# Patient Record
Sex: FEMALE | Race: BLACK OR AFRICAN AMERICAN
[De-identification: names, ages, dates, MRNs, and addresses within clinical notes are randomized per-mention and may not be internally consistent; named-entity substitution may affect disease eponyms.]

---

## 2021-05-06 ENCOUNTER — HOSPITAL ENCOUNTER (EMERGENCY)
Dept: HOSPITAL 53 - M ED | Age: 21
Discharge: HOME | End: 2021-05-06
Payer: COMMERCIAL

## 2021-05-06 VITALS — SYSTOLIC BLOOD PRESSURE: 110 MMHG | DIASTOLIC BLOOD PRESSURE: 68 MMHG

## 2021-05-06 VITALS — HEIGHT: 62 IN | BODY MASS INDEX: 26.55 KG/M2 | WEIGHT: 144.29 LBS
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
## 2021-05-06 DIAGNOSIS — Z87.81: ICD-10-CM
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
## 2021-05-06 DIAGNOSIS — M25.559: ICD-10-CM

## 2021-05-06 DIAGNOSIS — S76.019A: Primary | ICD-10-CM

## 2021-05-06 LAB
B-HCG SERPL-ACNC: (no result) MIU/ML
BASOPHILS # BLD AUTO: 0 10^3/UL (ref 0–0.2)
BASOPHILS NFR BLD AUTO: 0.2 % (ref 0–1)
CALCIUM SERPL-MCNC: 9.9 MG/DL (ref 8.5–10.1)
CRP SERPL-MCNC: < 0.3 MG/DL (ref 0–0.3)
EOSINOPHIL # BLD AUTO: 0 10^3/UL (ref 0–0.5)
EOSINOPHIL NFR BLD AUTO: 0.3 % (ref 0–3)
ERYTHROCYTE [SEDIMENTATION RATE] IN BLOOD BY WESTERGREN METHOD: 18 MM/HR (ref 0–20)
HCT VFR BLD AUTO: 35.4 % (ref 36–47)
HGB BLD-MCNC: 11.8 G/DL (ref 12–15.5)
LYMPHOCYTES # BLD AUTO: 2.1 10^3/UL (ref 1.5–5)
LYMPHOCYTES NFR BLD AUTO: 23.8 % (ref 24–44)
MCH RBC QN AUTO: 29.1 PG (ref 27–33)
MCHC RBC AUTO-ENTMCNC: 33.3 G/DL (ref 32–36.5)
MCV RBC AUTO: 87.4 FL (ref 80–96)
MONOCYTES # BLD AUTO: 0.6 10^3/UL (ref 0–0.8)
MONOCYTES NFR BLD AUTO: 6.2 % (ref 2–8)
NEUTROPHILS # BLD AUTO: 6.1 10^3/UL (ref 1.5–8.5)
NEUTROPHILS NFR BLD AUTO: 69.1 % (ref 36–66)
PLATELET # BLD AUTO: 239 10^3/UL (ref 150–450)
RBC # BLD AUTO: 4.05 10^6/UL (ref 4–5.4)
TSH SERPL DL<=0.005 MIU/L-ACNC: 1.18 UIU/ML (ref 0.36–3.74)
WBC # BLD AUTO: 8.9 10^3/UL (ref 4–10)

## 2021-07-28 ENCOUNTER — HOSPITAL ENCOUNTER (OUTPATIENT)
Dept: HOSPITAL 53 - M LDO | Age: 21
Discharge: HOME | End: 2021-07-28
Attending: HEALTH CARE PROVIDER
Payer: COMMERCIAL

## 2021-07-28 ENCOUNTER — HOSPITAL ENCOUNTER (EMERGENCY)
Dept: HOSPITAL 53 - M ED | Age: 21
Discharge: STILL ADMITTED OR ADMINISTRATIVE | End: 2021-07-28
Payer: COMMERCIAL

## 2021-07-28 VITALS — HEIGHT: 62 IN | WEIGHT: 155.32 LBS | BODY MASS INDEX: 28.58 KG/M2

## 2021-07-28 VITALS — DIASTOLIC BLOOD PRESSURE: 58 MMHG | SYSTOLIC BLOOD PRESSURE: 116 MMHG

## 2021-07-28 VITALS — DIASTOLIC BLOOD PRESSURE: 85 MMHG | SYSTOLIC BLOOD PRESSURE: 149 MMHG

## 2021-07-28 VITALS — WEIGHT: 156.09 LBS | BODY MASS INDEX: 28.72 KG/M2 | HEIGHT: 62 IN

## 2021-07-28 DIAGNOSIS — O26.892: ICD-10-CM

## 2021-07-28 DIAGNOSIS — Z53.21: Primary | ICD-10-CM

## 2021-07-28 DIAGNOSIS — K21.9: ICD-10-CM

## 2021-07-28 DIAGNOSIS — R10.10: ICD-10-CM

## 2021-07-28 DIAGNOSIS — O99.612: Primary | ICD-10-CM

## 2021-07-28 DIAGNOSIS — Z3A.00: ICD-10-CM

## 2021-07-28 DIAGNOSIS — Z3A.22: ICD-10-CM

## 2021-07-28 DIAGNOSIS — Z65.8: ICD-10-CM

## 2021-07-28 PROCEDURE — 59025 FETAL NON-STRESS TEST: CPT

## 2021-07-28 NOTE — IPNPDOC
Subjective


Date Seen


The patient was seen on 21.





Subjective


Chief Complaint/HPI


21yo  at 22w2d with OMA 21 presents to triage from the ER due to upper

abdominal pain and overall anxiety regarding the status of her baby.  Patient 

states that she is very overwhelmed at work right now and is surrounded by other

women who have recently experienced miscarriages or  deliveries, and is 

concerned about her baby.  She states her pain began this afternoon, which she 

describes as sharp/burning, and feels something hot come up in her throat. 

Denies any N/V, diarrhea or constipation.  She feels like the pain started when 

she became anxious at work.   She is otherwise without complaint. Denies 

contractions, LOF, VB or discharge. +GFM. She recently had anatomy US completed 

with incomplete views (per patient) and is scheduled for follow up in a few 

weeks.


General:  Reports: Normal Appetite; 


   Denies: Chills, Night Sweats, Fatigue, Malaise


Constitutional:  Denies: Chills, Fever, Night Sweats


Pulmonary:  Denies: Dyspnea, Cough


Gastrointestinal:  Reports: Abdominal Pain; 


   Denies: Nausea, Vomiting, Diarrhea, Constipation


Genitourinary:  Denies: Dysuria, Frequency, Incontinence, Retention


Psych:  Reports: Mood Normal; 


   Denies: Depression, Memory Issues





Objective


Physical Examination


General Exam:  Positive: Alert, No Acute Distress


Chest Exam:  Positive: Normal air movement


Heart Exam:  Positive: Rate Normal


Abdomen Exam:  Positive: Soft; 


   Negative: Tenderness (generalized discomfort on palpation of upper abdomen.  

No lower abdominal pain)


Other physical findings


TAUS: cephalic presentation, 's, subjectively normal fluid. Posterior 

placenta. Fetal anatomy not assessed.





[place]: No uterine contractions.  Some irritability noted, pt hydrating.





Assessment /Plan


Assessment


21yo  at 22w2d with OMA 2021 with upper abdominal pain and symptoms 

most consistent with reflux.  Pepcid provided in triage and pt provided water 

and encouraged to hydrate.  Has had less than 1 L of fluid today per her report.

 Given patient's level of concern for baby, inquired about any trauma/abuse, and

if she feels safe at home. States [place] and is

trying to get out.  Denies any trauma, however states she does not sleep well 

which adds to her anxiety.  Rx provided for pepcid twice daily and hydroxizine 

qHS - encouraged pt to take to [facility] tomorrow to fill Rx.  Bedside 

TAUS performed and reassuring. Normal fluid, posterior placenta, and normal FHR 

noted.  SVE deferred as pt currently denies any OB complaint. Has some 

irritability on toco, likely due to dehydration.  States she has [facility] follow up 

in a couple of weeks. Return precautions given.





Note provided requesting permission for patient to arrive late to work tomorrow 

given late assessment this evening and her difficulty with sleep.





Plan/VTE


VTE Prophylaxis Ordered?:  No


VTE Exclusion Mechanical Proph:  Other (triage visit )





VS, I&O, 24H, Fishbone


Vital Signs/I&O





Vital Signs








  Date Time  Temp Pulse Resp B/P (MAP) Pulse Ox O2 Delivery O2 Flow Rate FiO2


 


21 21:27 98.3 77 18 116/58 (77)    

















ROBIN KAMARA M.D.        2021 22:54

## 2021-08-12 ENCOUNTER — HOSPITAL ENCOUNTER (EMERGENCY)
Dept: HOSPITAL 53 - M ED | Age: 21
Discharge: HOME | End: 2021-08-12
Payer: COMMERCIAL

## 2021-08-12 VITALS — DIASTOLIC BLOOD PRESSURE: 63 MMHG | SYSTOLIC BLOOD PRESSURE: 113 MMHG

## 2021-08-12 VITALS — WEIGHT: 160.34 LBS | HEIGHT: 62 IN | BODY MASS INDEX: 29.51 KG/M2

## 2021-08-12 DIAGNOSIS — Z3A.25: ICD-10-CM

## 2021-08-12 DIAGNOSIS — O99.512: Primary | ICD-10-CM

## 2021-08-12 DIAGNOSIS — O99.412: ICD-10-CM

## 2021-08-12 DIAGNOSIS — O99.342: ICD-10-CM

## 2021-08-12 DIAGNOSIS — O99.332: ICD-10-CM

## 2021-08-12 LAB
ALBUMIN SERPL BCG-MCNC: 2.8 GM/DL (ref 3.2–5.2)
ALT SERPL W P-5'-P-CCNC: 18 U/L (ref 12–78)
BASOPHILS # BLD AUTO: 0 10^3/UL (ref 0–0.2)
BASOPHILS NFR BLD AUTO: 0.2 % (ref 0–1)
BILIRUB CONJ SERPL-MCNC: < 0.1 MG/DL (ref 0–0.2)
BILIRUB SERPL-MCNC: 0.2 MG/DL (ref 0.2–1)
BUN SERPL-MCNC: 6 MG/DL (ref 7–18)
CALCIUM SERPL-MCNC: 8.9 MG/DL (ref 8.5–10.1)
CHLORIDE SERPL-SCNC: 109 MEQ/L (ref 98–107)
CK MB CFR.DF SERPL CALC: 1.52
CK MB SERPL-MCNC: < 1 NG/ML (ref ?–3.6)
CK SERPL-CCNC: 66 U/L (ref 26–192)
CO2 SERPL-SCNC: 22 MEQ/L (ref 21–32)
CREAT SERPL-MCNC: 0.5 MG/DL (ref 0.55–1.3)
EOSINOPHIL # BLD AUTO: 0 10^3/UL (ref 0–0.5)
EOSINOPHIL NFR BLD AUTO: 0.1 % (ref 0–3)
GFR SERPL CREATININE-BSD FRML MDRD: > 60 ML/MIN/{1.73_M2} (ref 60–?)
GLUCOSE SERPL-MCNC: 88 MG/DL (ref 70–100)
HCT VFR BLD AUTO: 30.5 % (ref 36–47)
HGB BLD-MCNC: 10.3 G/DL (ref 12–15.5)
LYMPHOCYTES # BLD AUTO: 1.7 10^3/UL (ref 1.5–5)
LYMPHOCYTES NFR BLD AUTO: 16.7 % (ref 24–44)
MCH RBC QN AUTO: 30.2 PG (ref 27–33)
MCHC RBC AUTO-ENTMCNC: 33.8 G/DL (ref 32–36.5)
MCV RBC AUTO: 89.4 FL (ref 80–96)
MONOCYTES # BLD AUTO: 0.6 10^3/UL (ref 0–0.8)
MONOCYTES NFR BLD AUTO: 5.9 % (ref 2–8)
NEUTROPHILS # BLD AUTO: 7.9 10^3/UL (ref 1.5–8.5)
NEUTROPHILS NFR BLD AUTO: 75.6 % (ref 36–66)
NT-PRO BNP: 123 PG/ML (ref ?–125)
PLATELET # BLD AUTO: 222 10^3/UL (ref 150–450)
POTASSIUM SERPL-SCNC: 3.7 MEQ/L (ref 3.5–5.1)
PROT SERPL-MCNC: 6 GM/DL (ref 6.4–8.2)
RBC # BLD AUTO: 3.41 10^6/UL (ref 4–5.4)
SODIUM SERPL-SCNC: 138 MEQ/L (ref 136–145)
TROPONIN I SERPL-MCNC: < 0.02 NG/ML (ref ?–0.1)
WBC # BLD AUTO: 10.4 10^3/UL (ref 4–10)

## 2021-08-12 PROCEDURE — 93041 RHYTHM ECG TRACING: CPT

## 2021-08-12 PROCEDURE — 82553 CREATINE MB FRACTION: CPT

## 2021-08-12 PROCEDURE — 93005 ELECTROCARDIOGRAM TRACING: CPT

## 2021-08-12 PROCEDURE — 87798 DETECT AGENT NOS DNA AMP: CPT

## 2021-08-12 PROCEDURE — 83880 ASSAY OF NATRIURETIC PEPTIDE: CPT

## 2021-08-12 PROCEDURE — 85025 COMPLETE CBC W/AUTO DIFF WBC: CPT

## 2021-08-12 PROCEDURE — 84484 ASSAY OF TROPONIN QUANT: CPT

## 2021-08-12 PROCEDURE — 99284 EMERGENCY DEPT VISIT MOD MDM: CPT

## 2021-08-12 PROCEDURE — 82550 ASSAY OF CK (CPK): CPT

## 2021-08-12 PROCEDURE — 94760 N-INVAS EAR/PLS OXIMETRY 1: CPT

## 2021-08-12 PROCEDURE — 80048 BASIC METABOLIC PNL TOTAL CA: CPT

## 2021-08-12 PROCEDURE — 80076 HEPATIC FUNCTION PANEL: CPT

## 2021-08-12 PROCEDURE — 71275 CT ANGIOGRAPHY CHEST: CPT

## 2021-08-12 NOTE — REP
INDICATION:

SOB.



COMPARISON:

None.



TECHNIQUE:

Scans were obtained during contrast administration using the pulmonary embolus

technique.



FINDINGS:

The lungs are clear.  There is no evidence of pulmonary embolus.  The ascending and

descending thoracic aorta are unremarkable.  The great vessels show normal origins

from the arch.  There is no pleural fluid.  There is no adenopathy.  The adrenal

glands are not enlarged.  The heart is not enlarged.  There is no pleural effusion.



IMPRESSION:

Negative CTA chest.





<Electronically signed by Silas Higgins > 08/12/21 [id number]

## 2021-08-12 NOTE — ECGEPIP
[facility] - ED

                                       

                                       Test Date:    2021

Pat Name:     MURRAY ERIC            Department:   

Patient ID:   [id number]                 Room:         -

Gender:       Female                   Technician:   JOANNE

:          2000               Requested By: Radha Sotomayor 

Order Number: [id number]     Reading MD:   Esau Doyle

                                 Measurements

Intervals                              Axis          

Rate:         102                      P:            

IL:           164                      QRS:          35

QRSD:         70                       T:            -15

QT:           358                                    

QTc:          466                                    

                           Interpretive Statements

Sinus tachycardia

Nonspecific T wave abnormality

Comparison tracing not on file

Electronically Signed on 2021 20:58:29 EDT by Esau Doyle

## 2021-08-25 ENCOUNTER — HOSPITAL ENCOUNTER (EMERGENCY)
Dept: HOSPITAL 53 - M ED | Age: 21
Discharge: STILL ADMITTED OR ADMINISTRATIVE | End: 2021-08-25
Payer: COMMERCIAL

## 2021-08-25 ENCOUNTER — HOSPITAL ENCOUNTER (OUTPATIENT)
Dept: HOSPITAL 53 - M LDO | Age: 21
Discharge: HOME | End: 2021-08-25
Attending: ADVANCED PRACTICE MIDWIFE
Payer: COMMERCIAL

## 2021-08-25 VITALS — DIASTOLIC BLOOD PRESSURE: 56 MMHG | SYSTOLIC BLOOD PRESSURE: 109 MMHG

## 2021-08-25 VITALS — HEIGHT: 62 IN | WEIGHT: 160.28 LBS | BODY MASS INDEX: 29.49 KG/M2

## 2021-08-25 VITALS — BODY MASS INDEX: 29.51 KG/M2 | HEIGHT: 62 IN | WEIGHT: 160.34 LBS

## 2021-08-25 VITALS — DIASTOLIC BLOOD PRESSURE: 63 MMHG | SYSTOLIC BLOOD PRESSURE: 131 MMHG

## 2021-08-25 DIAGNOSIS — O26.892: Primary | ICD-10-CM

## 2021-08-25 DIAGNOSIS — Z3A.26: ICD-10-CM

## 2021-08-25 PROCEDURE — 59025 FETAL NON-STRESS TEST: CPT

## 2021-08-25 NOTE — IPNPDOC
Obstetrical Progress Note


Date of Service


Aug 25, 2021





Subjective


21yo  at 26w2d with OMA 21 presents to triage with complaint of 

overall 'not feeling well'.  She was initially evaluated at sick call and was 

sent to triage due to pregnancy.  Her symptoms are very vague.  Denies any N/V, 

diarrhea or constipation.  States that her symptoms began at work when she felt 

like she needed to have a BM but 'held it in' to finish some work, and then when

she went to go she couldn't.  She states her last normal BM was this AM. She has

no acute symptoms on arrival, but is requesting to reduce work hours.  Per prior

discussion with her on last triage visit at 22wks, pt admitted that she is 

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  

She is currently supposed to be residing in the [facility] but refuses to stay 

there.  She is requesting a quarters slip for tomorrow so that she has more time

to rest, or alternatively is requesting a note for reduction in work hours. She 

has no OB complaints at this time. Denies contractions, LOF, VB or discharge. 

+GFM. 








Objective





Vital Signs








  Date Time  Temp Pulse Resp B/P (MAP) Pulse Ox O2 Delivery O2 Flow Rate FiO2


 


21 15:25 97.6 95 16 131/63 (85) 100 Room Air  











Fetal Assessment


Fetal Heart Rate (FHR):  140


Variability:  Moderate


Accelerations:  Positive


Decelerations:  None


Fetal Heart Rate Tracing:  Category I





Tocometer


Contractions:  Yes (uterine irritability noted - pt not feeling)





Assessment and Plan


Fetal Status:  Reassuring


Additional Comments


21yo  at 26w2d with OMA 2021 with vague complaints.  After long 

discussion with patient, it appears she is very anxious and sleep deprived.  

Suggested multiple ways for her to improve sleep (stay overnight at [facility] 

instead of making the long, unnecessary trip to [place]), using accrued leave, or 

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  Discussed with her that

at this time I have no medical reason why she cannot work. Although I agree that

she needs to increase her rest, I will not write her a note for reduced work 

hours simply so she has time to drive to and from work as I do not endorse her 

decision to travel the long distance home when she has a place to stay in town. 

Patient states she has social issues at home that she needs to attend to but 

does not elaborate.  Recommended referral to  so she may discuss these issues 

further and to help determine if a mental leave of absence is warranted.  Pt 

agrees, will send referral today.  Asked patient if there is anything further I 

can assist with today given her vague complaints and normal exam. States she 

would like something to help with BM.  Paper Rx provided for miralax, and states

she already has colace at home. No quarters slip provided, pt again encouraged 

NOT to drive and to remain at the [place]. All questions answered, follow up as

scheduled .











ROBIN KAMARA M.D.        Aug 25, 2021 16:57